# Patient Record
Sex: MALE | Race: WHITE | NOT HISPANIC OR LATINO | URBAN - METROPOLITAN AREA
[De-identification: names, ages, dates, MRNs, and addresses within clinical notes are randomized per-mention and may not be internally consistent; named-entity substitution may affect disease eponyms.]

---

## 2017-05-06 ENCOUNTER — EMERGENCY (EMERGENCY)
Facility: HOSPITAL | Age: 37
LOS: 1 days | Discharge: HOME | End: 2017-05-06
Attending: EMERGENCY MEDICINE | Admitting: EMERGENCY MEDICINE
Payer: SELF-PAY

## 2017-05-06 VITALS
SYSTOLIC BLOOD PRESSURE: 143 MMHG | DIASTOLIC BLOOD PRESSURE: 95 MMHG | HEART RATE: 76 BPM | RESPIRATION RATE: 16 BRPM | OXYGEN SATURATION: 100 % | TEMPERATURE: 98 F

## 2017-05-06 NOTE — ED ADULT TRIAGE NOTE - CHIEF COMPLAINT QUOTE
pt amb to triage by EMS c/o b/l foot swelling x 1 week, also states homeless x 1 1/2 years requesting  and podiatrist, appears in NAD @ this time

## 2017-05-07 NOTE — ED PROVIDER NOTE - OBJECTIVE STATEMENT
36yo male reports being homeless for a long period of time and his  is not helping him with shelters. He says he has been walking a lot over the last few days and his feet hurt and are swollen.  There is no fevers or other complaints at this time.

## 2017-05-07 NOTE — ED ADULT NURSE REASSESSMENT NOTE - NS ED NURSE REASSESS COMMENT FT1
Pt d/c by MD silver, silvadine cream provided for foot irritation and d/c instructions provided along with list of shelters, pt refusing to sign paperwork, MD and charge nurse aware. Pt inn NAD currently, security called to escort pt and girlfriend off of the premises.

## 2017-10-09 ENCOUNTER — EMERGENCY (EMERGENCY)
Facility: HOSPITAL | Age: 37
LOS: 0 days | Discharge: HOME | End: 2017-10-09
Attending: EMERGENCY MEDICINE
Payer: MEDICAID

## 2017-10-09 VITALS
WEIGHT: 195.11 LBS | HEIGHT: 69 IN | TEMPERATURE: 98 F | DIASTOLIC BLOOD PRESSURE: 72 MMHG | RESPIRATION RATE: 17 BRPM | OXYGEN SATURATION: 98 % | SYSTOLIC BLOOD PRESSURE: 113 MMHG | HEART RATE: 61 BPM

## 2017-10-09 DIAGNOSIS — L03.011 CELLULITIS OF RIGHT FINGER: ICD-10-CM

## 2017-10-09 DIAGNOSIS — M79.641 PAIN IN RIGHT HAND: ICD-10-CM

## 2017-10-09 DIAGNOSIS — B86 SCABIES: ICD-10-CM

## 2017-10-09 DIAGNOSIS — B85.0 PEDICULOSIS DUE TO PEDICULUS HUMANUS CAPITIS: ICD-10-CM

## 2017-10-09 RX ORDER — ACETAMINOPHEN 500 MG
650 TABLET ORAL ONCE
Refills: 0 | Status: COMPLETED | OUTPATIENT
Start: 2017-10-09 | End: 2017-10-09

## 2017-10-09 RX ORDER — AZTREONAM 2 G
1 VIAL (EA) INJECTION
Qty: 14 | Refills: 0
Start: 2017-10-09 | End: 2017-10-16

## 2017-10-09 RX ORDER — IBUPROFEN 200 MG
1 TABLET ORAL
Qty: 15 | Refills: 0
Start: 2017-10-09 | End: 2017-10-14

## 2017-10-09 RX ORDER — PERMETHRIN CREAM 5% W/W 50 MG/G
1 CREAM TOPICAL ONCE
Refills: 0 | Status: COMPLETED | OUTPATIENT
Start: 2017-10-09 | End: 2017-10-09

## 2017-10-09 RX ORDER — IBUPROFEN 200 MG
600 TABLET ORAL ONCE
Refills: 0 | Status: COMPLETED | OUTPATIENT
Start: 2017-10-09 | End: 2017-10-09

## 2017-10-09 RX ORDER — TETANUS TOXOID, REDUCED DIPHTHERIA TOXOID AND ACELLULAR PERTUSSIS VACCINE, ADSORBED 5; 2.5; 8; 8; 2.5 [IU]/.5ML; [IU]/.5ML; UG/.5ML; UG/.5ML; UG/.5ML
0.5 SUSPENSION INTRAMUSCULAR ONCE
Refills: 0 | Status: COMPLETED | OUTPATIENT
Start: 2017-10-09 | End: 2017-10-09

## 2017-10-09 RX ADMIN — PERMETHRIN CREAM 5% W/W 1 APPLICATION(S): 50 CREAM TOPICAL at 04:36

## 2017-10-09 RX ADMIN — Medication 600 MILLIGRAM(S): at 04:34

## 2017-10-09 RX ADMIN — TETANUS TOXOID, REDUCED DIPHTHERIA TOXOID AND ACELLULAR PERTUSSIS VACCINE, ADSORBED 0.5 MILLILITER(S): 5; 2.5; 8; 8; 2.5 SUSPENSION INTRAMUSCULAR at 04:39

## 2017-10-09 RX ADMIN — Medication 1 TABLET(S): at 04:34

## 2017-10-09 RX ADMIN — Medication 650 MILLIGRAM(S): at 04:37

## 2017-10-09 RX ADMIN — Medication 1 TABLET(S): at 04:35

## 2017-10-09 NOTE — ED PROVIDER NOTE - CARE PLAN
Principal Discharge DX:	Cellulitis of finger of right hand  Secondary Diagnosis:	Scabies  Secondary Diagnosis:	Lice infested hair

## 2017-10-09 NOTE — ED ADULT NURSE REASSESSMENT NOTE - NS ED NURSE REASSESS COMMENT FT1
Pt discharged. Left with girlfriend after being told nicely by MD, staff & security to exit ER after discharge.

## 2017-10-09 NOTE — ED PROVIDER NOTE - MEDICAL DECISION MAKING DETAILS
Patient pw multiple problems. 1 - scabies - will treat with permetherin. 2. lice - will treat with permetherin. have recommended patient to remove his hair to maximize liklihood of resolution. 3. cellulitis of the right hand from local wound. will give dual abx and tetanus shot. 4. foot ulcers superificial likely from walking in poor shoes extensively given homelessness. Patient pw multiple problems. 1 - scabies - will treat with permetherin. 2. lice - will treat with permetherin. have recommended patient to remove his hair to maximize liklihood of resolution. 3. cellulitis of the right hand from local wound. will give dual abx and tetanus shot. 4. foot ulcers superificial likely from walking in poor shoes extensively given homelessness. dc

## 2017-10-09 NOTE — ED ADULT NURSE NOTE - CHIEF COMPLAINT QUOTE
pt states that he walked from [facility] to [facility]/.  pt c/o bilateral foot pain with sores.  pt also c/o R-hand 4th digit swollen x 4 days.  rash on body.  pt is lives in a homeless shelter in [facility].

## 2017-10-09 NOTE — ED ADULT TRIAGE NOTE - CHIEF COMPLAINT QUOTE
pt states that he walked from [facility] to [facility]/.  pt c/o bilateral foot pain with sores.  pt also c/o R-hand 4th digit swollen x 4 days.  rash on body.  pt is lives in a homeless shelter in [facility].

## 2017-10-09 NOTE — ED PROVIDER NOTE - OBJECTIVE STATEMENT
Pertinent PMH/PSH/FHx/SHx and Review of Systems contained within:  37m non domiciled male w no med hx pw 4 problems. 1. itchy red lesions on abd and on pelvis for several weeks. 2. itchy scalp for several days 3. painful soles of feet for several days. 4. pain to the right hand after sustained right hand wound though he cannot recall when it started. no fever, nausea, vomiting, fever, sob, cp  Fh and Sh not otherwise contributory  ROS otherwise negative

## 2017-10-09 NOTE — ED ADULT NURSE NOTE - OBJECTIVE STATEMENT
Pt c/o pain & ulcers in B/L LE. Pt c/o  general pruritis.  Scattered Small red papules noted all over body.

## 2017-10-09 NOTE — ED PROVIDER NOTE - PHYSICAL EXAMINATION
Gen: Alert, disheveled, unkempt, malodorous   Head: NC, AT   Eyes: PERRL, EOMI, normal lids/conjunctiva  ENT: normal hearing, patent oropharynx without erythema/exudate, uvula midline  Neck: supple, no tenderness, Trachea midline  Pulm: Bilateral BS, normal resp effort, no wheeze/stridor/retractions  CV: RRR, no M/R/G, 2+ radial and dp pulses bl, no edema  Abd: soft, NT/ND, +BS, no hepatosplenomegaly  Mskel: extremities x4 with normal ROM and no joint effusions. no ctl spine ttp.   Skin: erythematous small ulcerations along lower abd and groin. lice knits in scalp. ulcerations in the bl soles of feet. cellulitis of the right hand located around dorsal aspect of base of ring finger. a small wound is noted   Neuro: AAOx3, no sensory/motor deficits, CN 2-12 intact

## 2018-04-08 ENCOUNTER — EMERGENCY (EMERGENCY)
Facility: HOSPITAL | Age: 38
LOS: 1 days | Discharge: LEFT BEFORE DISCHARGE | End: 2018-04-08
Attending: EMERGENCY MEDICINE | Admitting: EMERGENCY MEDICINE
Payer: MEDICAID

## 2018-04-08 VITALS
DIASTOLIC BLOOD PRESSURE: 92 MMHG | RESPIRATION RATE: 16 BRPM | SYSTOLIC BLOOD PRESSURE: 130 MMHG | TEMPERATURE: 98 F | OXYGEN SATURATION: 97 % | HEART RATE: 90 BPM

## 2018-04-08 RX ORDER — SODIUM CHLORIDE 9 MG/ML
1000 INJECTION INTRAMUSCULAR; INTRAVENOUS; SUBCUTANEOUS ONCE
Qty: 0 | Refills: 0 | Status: DISCONTINUED | OUTPATIENT
Start: 2018-04-08 | End: 2018-04-08

## 2018-04-08 NOTE — ED PROVIDER NOTE - REFUSAL OF SERVICE, MDM
Pt want to sign out against medical advice as he does not think he needs to be evaluated. He is here in ED to accompany his GF who is in ED for abd pain. Pt is aware that he is signing out AMA. Has no complaints, refusing blood work and meds. He has had an opportunity to ask all questions which were all answered. Pt has no further questions. We offered that pt can return any time to the ED if his condition continues/returns/worsens.
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
## 2018-04-08 NOTE — ED PROVIDER NOTE - PROGRESS NOTE DETAILS
BOUCHRA Palmer: pt is requesting to leave AMA. He understands that we would like to check his basic lab work to assess for any electrolyte abnormalities. He is requesting to leave, will have him sign out AMA. He understands the risks of leaving. BOUCHRA Palmer: pt is requesting to leave AMA. He understands that we would like to check his basic lab work to assess for any electrolyte abnormalities. He is requesting to leave, will have him sign out AMA. He understands the risks of leaving including but not limited to death. The pt is clinically sober, AA&Ox3, free from distracting injury.  Throughout our interactions in the ED today, the pt has demonstrated concrete thinking/reasoning, has maintained an orderly/reasonable conversation, appears to have intact insight/judgment/reason and therefore in our opinion has capacity to make decisions.  Given the pt’s presentation, we communicated our concern for dehydration.    The pt verbalized an understanding of our worries. We’ve told the patient that the ED evaluation is incomplete & many troublesome conditions haven’t been r/o.   We have discussed the need for further ED w/u so we can get more information about his current symptosm.  We have discussed the range of possible dx, potential testing & tx options.  We’ve made  numerous efforts to prevent the pt from leaving AMA.  Our discussions included the potential outcomes of leaving AMA, including worsening of their condition, becoming permanently disabled/in pain/critically ill, or death.  Despite these efforts, we were unable to convince the pt to stay.  The pt is refusing any  further care and is leaving against medical advice. We have attempted to offer tx/rx/guidance for any dangerous conditions which are most likely and/or dangerous.  We have answered all questions and have implored the pt to return ASAP to complete the w/u.  A staff member witnessed the patient consenting to AMA.
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
## 2018-04-08 NOTE — ED PROVIDER NOTE - MEDICAL DECISION MAKING DETAILS
37yM w/pmhx hep C BIBA with concern for dehydration. Will check basic labs, urine, give fluids and reassess.

## 2018-05-01 ENCOUNTER — OUTPATIENT (OUTPATIENT)
Dept: OUTPATIENT SERVICES | Facility: HOSPITAL | Age: 38
LOS: 1 days | End: 2018-05-01
Payer: MEDICAID

## 2018-05-08 ENCOUNTER — EMERGENCY (EMERGENCY)
Facility: HOSPITAL | Age: 38
LOS: 1 days | Discharge: HOME | End: 2018-05-08
Attending: EMERGENCY MEDICINE | Admitting: EMERGENCY MEDICINE
Payer: MEDICAID

## 2018-05-08 VITALS
HEART RATE: 69 BPM | RESPIRATION RATE: 16 BRPM | DIASTOLIC BLOOD PRESSURE: 93 MMHG | TEMPERATURE: 98 F | SYSTOLIC BLOOD PRESSURE: 149 MMHG | OXYGEN SATURATION: 100 %

## 2018-05-08 RX ORDER — ACETAMINOPHEN 500 MG
650 TABLET ORAL ONCE
Qty: 0 | Refills: 0 | Status: COMPLETED | OUTPATIENT
Start: 2018-05-08 | End: 2018-05-08

## 2018-05-08 RX ORDER — KETOROLAC TROMETHAMINE 30 MG/ML
30 SYRINGE (ML) INJECTION ONCE
Qty: 0 | Refills: 0 | Status: DISCONTINUED | OUTPATIENT
Start: 2018-05-08 | End: 2018-05-08

## 2018-05-08 RX ADMIN — Medication 30 MILLIGRAM(S): at 09:16

## 2018-05-08 RX ADMIN — Medication 30 MILLIGRAM(S): at 08:46

## 2018-05-08 RX ADMIN — Medication 650 MILLIGRAM(S): at 08:46

## 2018-05-08 RX ADMIN — Medication 650 MILLIGRAM(S): at 09:16

## 2018-05-08 NOTE — ED PROVIDER NOTE - OBJECTIVE STATEMENT
39 y/o M w/ PMHx of Bipolar, schizophrenia, Hep C and asthma, presents to the ED c/o b/l foot pain x3 weeks. Pt is homeless, states he is Hep C positive and can't afford medication to treat it. Pt also requests to speak to psych and social work. Denies fever, chills or any other complaints. Allergies: Haldol and Depakote. 37 y/o M w/ PMHx of Bipolar, schizophrenia, Hep C and asthma, presents to the ED c/o b/l foot pain x3 weeks. Pt is homeless, states he is Hep C positive and can't afford medication to treat it. Pt admits he is not taking his Seroquel and requests to speak to psych and social work. Denies fever, chills, SI, HI, hallucinations or any other complaints. Allergies: Haldol and Depakote.

## 2018-05-08 NOTE — ED PROVIDER NOTE - MEDICAL DECISION MAKING DETAILS
Blisters to b/l feet, no evidence of cellulitis  pain control, xrays Blisters to b/l feet, no evidence of cellulitis  pain control, xrays  [facility] Crisis clinic referral / SW eval

## 2018-05-08 NOTE — ED PROVIDER NOTE - PHYSICAL EXAMINATION
ATTENDING PHYSICAL EXAM DR. OCHOA ***GEN - NAD; well appearing; A+O x3 ***HEAD - NC/AT ***EYES/NOSE - PERRL, EOMI, mucous membranes moist, no discharge   ***PULMONARY - CTA b/l, symmetric breath sounds. ***CARDIAC -s1s2, RRR, no M,G,R  ***ABDOMEN - +BS, ND, NT, soft, no guarding, no rebound, no masses   ***BACK - no CVA tenderness, Normal  spine ***EXTREMITIES - symmetric pulses, 2+ dp, capillary refill < 2 seconds, no clubbing, no cyanosis, no edema ***SKIN - no rash +closed blisters to sole of b/l feet, no celluitis, no discharge   ***NEUROLOGIC - alert, sensory intact

## 2018-05-08 NOTE — PROVIDER CONTACT NOTE (OTHER) - ASSESSMENT
chris met with pt at bedside.  Pt and "wife" homeless.  was at [facility] last night and referral made to [facility] for the homeless [phone]  chris contacted referral and unable to speak with anyone.  called [facility] and spoke with Alana perez in ED.  Alana states that pt referral was sent to [facility] for the homeless.  information given to pt and need for him to f/u.  resources for shelter placement in [facility] given to pt to f/u.  [facility] cards also given.

## 2018-05-09 DIAGNOSIS — R69 ILLNESS, UNSPECIFIED: ICD-10-CM

## 2018-06-18 ENCOUNTER — EMERGENCY (EMERGENCY)
Facility: HOSPITAL | Age: 38
LOS: 1 days | Discharge: HOME | End: 2018-06-18
Attending: EMERGENCY MEDICINE | Admitting: EMERGENCY MEDICINE
Payer: MEDICAID

## 2018-06-18 VITALS
HEART RATE: 88 BPM | TEMPERATURE: 99 F | OXYGEN SATURATION: 98 % | RESPIRATION RATE: 18 BRPM | SYSTOLIC BLOOD PRESSURE: 134 MMHG | DIASTOLIC BLOOD PRESSURE: 89 MMHG

## 2018-06-18 NOTE — ED ADULT TRIAGE NOTE - CHIEF COMPLAINT QUOTE
Pt reports feeling "suicidal" states "I feel like I want to hurt myself. I also have a cold." When asked if homicidal pt states "I am trying to control it." PMH bipolar, schizophrenia, social anxiety d/o. Hep C+. Denies drug or alcohol use today. Has not taken psych meds x2 months.

## 2018-06-19 VITALS
HEART RATE: 85 BPM | OXYGEN SATURATION: 98 % | RESPIRATION RATE: 18 BRPM | SYSTOLIC BLOOD PRESSURE: 132 MMHG | TEMPERATURE: 98 F | DIASTOLIC BLOOD PRESSURE: 82 MMHG

## 2018-06-19 NOTE — ED PROVIDER NOTE - OBJECTIVE STATEMENT
39 yo male currently undomiciled presenting asking to be admitted to the hospital as he is having thoughts of hurting himself.  He has no plan.  States he has been admitted recently for the same at another hospital.  Is also stating that he is hungry and wants 3 sandwiches.  Denies any HI  VH.

## 2018-06-19 NOTE — ED PROVIDER NOTE - MEDICAL DECISION MAKING DETAILS
pt presenting with reported SI - pt without a plan and asking for food repeatedly.  Discussed with BH attending and pt is appropriate for follow up at the crisis center in the morning.  Not an imminent threat to self or others to require involuntary hospitalization

## 2018-06-19 NOTE — ED ADULT NURSE NOTE - OBJECTIVE STATEMENT
ceci HALL aaOx3 states he has been feeling suicidal. pt states he was thinking of hurting himself. he states that he is trying different techniques to control himself. pt has past medical history of schizophrenia, social anxiety. pt denies drugs/alcohol today. ceci M aaOx3 states he has been feeling suicidal. pt states he was thinking of hurting himself. he states that he is trying different techniques to control himself. pt has past medical history of schizophrenia, social anxiety. pt denies drugs/alcohol today. pt denies AH/VH at this time. pt calm and cooperative upon initial assessment

## 2022-11-16 NOTE — ED ADULT TRIAGE NOTE - CHIEF COMPLAINT QUOTE
pt states "I am having feet pain for weeks. also I want to speak to psych I am bipolar and do not take my medication."
yes...

## 2023-01-13 NOTE — ED PROVIDER NOTE - CROS ED MUSC ALL NEG
1/13/2023      Abbie Nichols  [address]   [place] [address]        Dear Abbie,    Our records show that you may be due for one or more doses of the vaccines recommended by the Centers for Disease Control and Prevention.     Pneumococcal Vaccine 65+    If you are seeing a different Primary Care Provider and/or had the vaccine(s) listed above, please call our office so we can update our records. Otherwise, please call our office at Dept: [phone] to schedule an appointment, or you may schedule using the [employer] vimal.     Sincerely,       Sera Humphreys MD   [facility]-[address] [address]  Dept: [phone]       [facility] [place] offers online access to your health information via [email or web address]   - - -

## 2024-02-02 NOTE — ED ADULT NURSE NOTE - CHIEF COMPLAINT
The patient is a 37y Male complaining of Admission Reconciliation is Completed  Discharge Reconciliation is Not Complete Admission Reconciliation is Completed  Discharge Reconciliation is Completed

## 2025-01-20 NOTE — ED PROVIDER NOTE - CROS ED CONS ALL NEG
negative...